# Patient Record
Sex: MALE | Race: OTHER | HISPANIC OR LATINO | URBAN - METROPOLITAN AREA
[De-identification: names, ages, dates, MRNs, and addresses within clinical notes are randomized per-mention and may not be internally consistent; named-entity substitution may affect disease eponyms.]

---

## 2020-09-13 ENCOUNTER — EMERGENCY (EMERGENCY)
Facility: HOSPITAL | Age: 25
LOS: 0 days | Discharge: HOME | End: 2020-09-13
Payer: MEDICAID

## 2020-09-13 DIAGNOSIS — Z20.828 CONTACT WITH AND (SUSPECTED) EXPOSURE TO OTHER VIRAL COMMUNICABLE DISEASES: ICD-10-CM

## 2020-09-13 PROCEDURE — 99283 EMERGENCY DEPT VISIT LOW MDM: CPT

## 2020-09-13 PROCEDURE — U0003: CPT

## 2020-09-13 NOTE — ED STATDOCS - PATIENT PORTAL LINK FT
You can access the FollowMyHealth Patient Portal offered by [facility] by registering at the following website: [email or web address]. By joining [employer]’s FollowMyHealth portal, you will also be able to view your health information using other applications (apps) compatible with our system.

## 2020-09-13 NOTE — ED STATDOCS - OBJECTIVE STATEMENT
26 yo male presents for covid testing s/p exposure to a family member that tested positive during their family gathering last week.

## 2020-09-14 LAB — SARS-COV-2 RNA SPEC QL NAA+PROBE: SIGNIFICANT CHANGE UP

## 2020-10-27 ENCOUNTER — EMERGENCY (EMERGENCY)
Facility: HOSPITAL | Age: 25
LOS: 0 days | Discharge: HOME | End: 2020-10-27
Payer: MEDICAID

## 2020-10-27 VITALS
TEMPERATURE: 99 F | RESPIRATION RATE: 18 BRPM | OXYGEN SATURATION: 99 % | SYSTOLIC BLOOD PRESSURE: 100 MMHG | DIASTOLIC BLOOD PRESSURE: 47 MMHG | HEART RATE: 60 BPM

## 2020-10-27 DIAGNOSIS — R07.0 PAIN IN THROAT: ICD-10-CM

## 2020-10-27 DIAGNOSIS — R52 PAIN, UNSPECIFIED: ICD-10-CM

## 2020-10-27 DIAGNOSIS — J02.9 ACUTE PHARYNGITIS, UNSPECIFIED: ICD-10-CM

## 2020-10-27 PROBLEM — Z78.9 OTHER SPECIFIED HEALTH STATUS: Chronic | Status: ACTIVE | Noted: 2020-09-13

## 2020-10-27 LAB — SARS-COV-2 RNA SPEC QL NAA+PROBE: SIGNIFICANT CHANGE UP

## 2020-10-27 PROCEDURE — 99283 EMERGENCY DEPT VISIT LOW MDM: CPT

## 2020-10-27 PROCEDURE — U0003: CPT

## 2020-10-27 NOTE — ED ADULT NURSE NOTE - OBJECTIVE STATEMENT
Patient comes to ED for COVID swab, denies any exposure. Patient reports "feeling sick"  Patient provides verbal consent to receive results via text or email.  Patient evaluated, treated, and discharged by PA. Refer to PA note for assessment.

## 2020-10-27 NOTE — ED STATDOCS - NS ED ROS FT
ROS: Constitutional- no fever, no chills.  Respiratory- no cough, no SOB  Cardiac- no chest pain, no palpitations, ENT- no rhinorrhea, + sore throat, no congestion.  Abdomen- No nausea, no vomiting, no diarrhea.  Urinary- no dysuria, no urgency, no frequency.  Skin- No rashes

## 2020-10-27 NOTE — ED STATDOCS - PATIENT PORTAL LINK FT
You can access the FollowMyHealth Patient Portal offered by [facility] by registering at the following website: [email or web address]. By joining [employer]’s FollowMyHealth portal, you will also be able to view your health information using other applications (apps) compatible with our system.

## 2020-10-27 NOTE — ED ADULT TRIAGE NOTE - CHIEF COMPLAINT QUOTE
Patient comes to ED for COVID testing. Patient reports "Feeling Sick" Patient denies any COVID exposure.   Patient provides verbal consent to receive results via text or email.

## 2020-10-27 NOTE — ED STATDOCS - NSFOLLOWUPINSTRUCTIONS_ED_ALL_ED_FT
How to get your Coronavirus (COVID-19) Testing Results:   Please be advised that you were tested for the coronavirus (COVID-19) in the Emergency Department at [facility].  You are to maintain self-quarantine procedures for 14 days until instructed otherwise by one of our healthcare agents. Please note that the test may take up to 2-4 days to result.  If you do not hear from us within 72 hours and you'd like to check on your results, you can call on of our coronavirus specialists at [address] [place] [address] (available 24/7).  Please DO NOT call the site where you received the test to obtain your results.

## 2020-10-27 NOTE — ED STATDOCS - OBJECTIVE STATEMENT
He presents reporting 1 day of sore throat and body aches.  Unsure if he was exposed to COVID19 but would like to be tested.

## 2022-01-02 ENCOUNTER — OUTPATIENT (OUTPATIENT)
Dept: OUTPATIENT SERVICES | Facility: HOSPITAL | Age: 27
LOS: 1 days | End: 2022-01-02
Payer: MEDICAID

## 2022-01-02 DIAGNOSIS — Z20.828 CONTACT WITH AND (SUSPECTED) EXPOSURE TO OTHER VIRAL COMMUNICABLE DISEASES: ICD-10-CM

## 2022-01-02 LAB — SARS-COV-2 RNA SPEC QL NAA+PROBE: SIGNIFICANT CHANGE UP

## 2022-01-02 PROCEDURE — C9803: CPT

## 2022-01-02 PROCEDURE — U0005: CPT

## 2022-01-02 PROCEDURE — U0003: CPT

## 2022-01-03 DIAGNOSIS — Z20.828 CONTACT WITH AND (SUSPECTED) EXPOSURE TO OTHER VIRAL COMMUNICABLE DISEASES: ICD-10-CM

## 2023-06-09 ENCOUNTER — EMERGENCY (EMERGENCY)
Facility: HOSPITAL | Age: 28
LOS: 1 days | Discharge: HOME | End: 2023-06-09
Attending: STUDENT IN AN ORGANIZED HEALTH CARE EDUCATION/TRAINING PROGRAM | Admitting: STUDENT IN AN ORGANIZED HEALTH CARE EDUCATION/TRAINING PROGRAM
Payer: MEDICAID

## 2023-06-09 VITALS
DIASTOLIC BLOOD PRESSURE: 61 MMHG | HEART RATE: 75 BPM | TEMPERATURE: 98 F | HEIGHT: 66 IN | WEIGHT: 138.45 LBS | OXYGEN SATURATION: 97 % | SYSTOLIC BLOOD PRESSURE: 107 MMHG | RESPIRATION RATE: 18 BRPM

## 2023-06-09 VITALS
DIASTOLIC BLOOD PRESSURE: 67 MMHG | RESPIRATION RATE: 18 BRPM | HEART RATE: 81 BPM | SYSTOLIC BLOOD PRESSURE: 115 MMHG | TEMPERATURE: 98 F | OXYGEN SATURATION: 96 %

## 2023-06-09 PROCEDURE — 90715 TDAP VACCINE 7 YRS/> IM: CPT

## 2023-06-09 PROCEDURE — 73110 X-RAY EXAM OF WRIST: CPT

## 2023-06-09 PROCEDURE — 73110 X-RAY EXAM OF WRIST: CPT | Mod: 26,RT

## 2023-06-09 PROCEDURE — 29125 APPL SHORT ARM SPLINT STATIC: CPT | Mod: RT

## 2023-06-09 PROCEDURE — 90471 IMMUNIZATION ADMIN: CPT

## 2023-06-09 PROCEDURE — 99283 EMERGENCY DEPT VISIT LOW MDM: CPT | Mod: 25

## 2023-06-09 PROCEDURE — 99284 EMERGENCY DEPT VISIT MOD MDM: CPT | Mod: 25

## 2023-06-09 RX ORDER — TETANUS TOXOID, REDUCED DIPHTHERIA TOXOID AND ACELLULAR PERTUSSIS VACCINE, ADSORBED 5; 2.5; 8; 8; 2.5 [IU]/.5ML; [IU]/.5ML; UG/.5ML; UG/.5ML; UG/.5ML
0.5 SUSPENSION INTRAMUSCULAR ONCE
Refills: 0 | Status: COMPLETED | OUTPATIENT
Start: 2023-06-09 | End: 2023-06-09

## 2023-06-09 RX ORDER — IBUPROFEN 200 MG
600 TABLET ORAL ONCE
Refills: 0 | Status: COMPLETED | OUTPATIENT
Start: 2023-06-09 | End: 2023-06-09

## 2023-06-09 RX ADMIN — Medication 600 MILLIGRAM(S): at 21:57

## 2023-06-09 RX ADMIN — TETANUS TOXOID, REDUCED DIPHTHERIA TOXOID AND ACELLULAR PERTUSSIS VACCINE, ADSORBED 0.5 MILLILITER(S): 5; 2.5; 8; 8; 2.5 SUSPENSION INTRAMUSCULAR at 21:38

## 2023-06-09 RX ADMIN — Medication 600 MILLIGRAM(S): at 21:37

## 2023-06-09 NOTE — ED PROVIDER NOTE - NS ED ATTENDING STATEMENT MOD
This was a shared visit with the [facility]. I reviewed and verified the documentation and independently performed the documented:

## 2023-06-09 NOTE — ED ADULT NURSE NOTE - OBJECTIVE STATEMENT
pt presented with c/o right wrist pain/injury. pt reports he was skateboarding and fell an hour ago.

## 2023-06-09 NOTE — ED PROVIDER NOTE - ADDITIONAL NOTES AND INSTRUCTIONS:
Patient cannot use his right arm/wrist until cleared by Orthopedist. Has to keep splint clean/dry/intact.

## 2023-06-09 NOTE — ED ADULT NURSE REASSESSMENT NOTE - NS ED NURSE REASSESS COMMENT FT1
Tests resulted, right arm splinted by PA and placed on sling, VSS, pt reevaluated by MD, D/C home with f/u instructions.

## 2023-06-09 NOTE — ED ADULT NURSE REASSESSMENT NOTE - NSFALLRISKFACTORS_ED_ALL_ED
wrist fracture/Bone Condition: Including osteoporosis, prolonged steroid use or metastatic bone disease/cancer

## 2023-06-09 NOTE — ED PROVIDER NOTE - MUSCULOSKELETAL, MLM
+ttp with mild swelling noted to distal ulna, ROM intact right wrist, abrasion noted to palm, R shoulder/elbow/hand NT with FROM, no snuffbox tenderness, fingers warm & mobile, able to make a fist, cap refill<2sec, distal pulses and sensation intact, NVI

## 2023-06-09 NOTE — ED PROVIDER NOTE - PATIENT PORTAL LINK FT
You can access the FollowMyHealth Patient Portal offered by [facility] by registering at the following website: [email or web address]. By joining [facility] [employer]’s FollowMyHealth portal, you will also be able to view your health information using other applications (apps) compatible with our system.

## 2023-06-09 NOTE — ED PROVIDER NOTE - CARE PROVIDER_API CALL
Jose Rafael Cedeno.  Orthopaedic Surgery  [address], [address]  [place] [address]  Phone: [phone]  Fax: [phone]  Follow Up Time: 1-3 Days

## 2023-06-09 NOTE — ED PROVIDER NOTE - ATTENDING APP SHARED VISIT CONTRIBUTION OF CARE
28-year-old male with no significant PMH presents with right wrist pain status post fall off a skateboard.  Patient states he lost his balance and landed on his right wrist.  Currently complains of pain to the lateral aspect of his wrist.  Patient also sustained superficial abrasions to the area, unknown last tetanus.  Patient denies head trauma or LOC.  Denies any other injuries.    AO x 3 in NAD  Head NC/AT, full ROM of c-spine  RRR, S1S2  Lungs CTA b/l  Right wrist with swelling along ulnar aspect, +tenderness at ulnar styloid with +TTP and ecchymosis.  Full ROM of right wrist and finger.  Abrasions to right palm.  No snuffbox tenderness.  Normal sensation, +radial pulse, cap refill < 2 seconds

## 2023-06-09 NOTE — ED PROVIDER NOTE - OBJECTIVE STATEMENT
28-year-old male with no past medical history presents with complaint of right wrist pain status post fall today.  Patient states that he was skateboarding and fell on his right wrist.  Patient is right-hand dominant.  Patient sustained abrasion to his hand and unsure of last tetanus.  Denies numbness, tingling, head trauma, LOC, other injuries or symptoms.

## 2023-06-09 NOTE — ED PROVIDER NOTE - NSFOLLOWUPINSTRUCTIONS_ED_ALL_ED_FT
Follow-up with orthopedist for reevaluation, ongoing care and treatment.  Rest, nonweightbearing right arm, elevate wrist, ice compresses, take Motrin 600 mg every 6 hours with food as needed for pain.  Keep splint clean/dry/intact.  If having worsening of symptoms or other related symptoms, return to the ER immediately.    Wrist Fracture Treated With Immobilization    A wrist fracture is a break or crack in one of the bones of your wrist. Your wrist is made of eight small bones at the palm of your hand (carpal bones) and two long bones of the forearm (radius and ulna).    A broken wrist is often treated by wearing a cast, splint, or sling (immobilization). This holds the broken pieces in place so they can heal.    What are the causes?  A direct force to the wrist.  Trauma, such as a car crash or falling on an outstretched hand.  What increases the risk?  Doing contact sports or high-risk sports such as skiing, biking, and ice skating.  Taking steroid medicines.  Smoking.  Being female.  Being .  Drinking more than three drinks that contain alcohol a day.  Having a condition that weakens the bones (osteoporosis).  Being older.  Having had other broken bones in the past.  What are the signs or symptoms?  Pain.  Swelling.  Bruising.  Not being able to move the wrist normally.  The wrist hanging in an odd position or looking oddly shaped.  How is this treated?  Treatment involves wearing a cast, splint, or sling. You may also need to take pain medicine. Once the injury has healed enough, you may begin doing range-of-motion exercises.    Follow these instructions at home:  If you have a cast:    Do not stick anything inside the cast to scratch your skin.  Check the skin around the cast every day. Tell your doctor if you see problems.  You may put lotion on dry skin around the cast. Do not put lotion on the skin under the cast.  Keep the cast clean and dry.  If you have a splint or sling:    Wear the splint or sling as told by your doctor. Take it off only as told by your doctor.  Loosen the splint or sling if your fingers:  Tingle.  Become numb.  Turn cold and blue.  Keep the splint or sling clean and dry.  Bathing    Do not take baths, swim, or use a hot tub. Ask your doctor about taking showers or sponge baths.  If your cast, splint, or sling is not waterproof:  Do not let it get wet.  Cover it with a watertight covering when you take a bath or shower.  If you have a sling, take it off for bathing only if your doctor says this is okay.  Managing pain, stiffness, and swelling      If told, put ice on the injured area. To do this:  If you have a removable splint or sling, take it off as told by your doctor.  Put ice in a plastic bag.  Place a towel between your skin and the bag or between your cast and the bag.  Leave the ice on for 20 minutes, 2–3 times a day.  Take off the ice if your skin turns bright red. This is very important. If you cannot feel pain, heat, or cold, you have a greater risk of damage to the area.  Move your fingers often.  Raise the injured area above the level of your heart while you are sitting or lying down.  Activity    Return to your normal activities when your doctor says that it is safe.  Do not lift with or put weight on your injured wrist until your doctor says this is okay.  Ask your doctor when it is safe to drive if you have a cast, splint, or sling on your wrist.  Do range-of-motion exercises only as told by your doctor.  Medicines    Take over-the-counter and prescription medicines only as told by your doctor.  Ask your doctor if you should avoid driving or using machines while you are taking your medicine.  General instructions    Do not put pressure on any part of the cast or splint until it is fully hardened.  Do not smoke or use any products that contain nicotine or tobacco. If you need help quitting, ask your doctor.  If told, take steps to prevent problems with pooping (constipation). You may need to:  Drink enough fluid to keep your pee (urine) pale yellow.  Take medicines. You will be told what medicines to take.  Eat foods that are high in fiber. These include beans, whole grains, and fresh fruits and vegetables.  Limit foods that are high in fat and sugar. These include fried or sweet foods.  Keep all follow-up visits.  Contact a doctor if:  Your cast, splint, or sling is damaged or loose.  You have any new pain, swelling, or bruising.  Your pain, swelling, and bruising do not get better.  You have a fever.  You have chills.  Get help right away if:  Your skin or fingers on your injured arm turn blue or gray.  Your arm feels cold or gets numb.  You have very bad pain in your injured wrist.  Summary  A wrist fracture is a break or crack in one of the bones of your wrist.  A broken wrist is often treated by wearing a cast, splint, or sling.  You should check the skin around a cast every day.  Take off a splint or sling only as told by your doctor.  This information is not intended to replace advice given to you by your health care provider. Make sure you discuss any questions you have with your health care provider.

## 2023-06-09 NOTE — ED ADULT NURSE REASSESSMENT NOTE - NSFALLHARMRISKINTERV_ED_ALL_ED

## 2023-06-09 NOTE — ED PROVIDER NOTE - DIFFERENTIAL DIAGNOSIS
Ddx includes but not limited to fracture, avulsion, dislocation, sprain, hematoma Differential Diagnosis

## 2023-06-09 NOTE — ED PROVIDER NOTE - CLINICAL SUMMARY MEDICAL DECISION MAKING FREE TEXT BOX
28 year old male p/w right wrist pain s/p fall off skateboard.  +swelling and TTP to ulnar styloid.  X-ray, analgesia, update tetanus, splint as needed, ortho follow up

## 2023-06-09 NOTE — ED ADULT NURSE NOTE - NSFALLRISKINTERV_ED_ALL_ED

## 2023-06-14 ENCOUNTER — TRANSCRIPTION ENCOUNTER (OUTPATIENT)
Age: 28
End: 2023-06-14

## 2023-06-14 ENCOUNTER — APPOINTMENT (OUTPATIENT)
Dept: ORTHOPEDIC SURGERY | Facility: CLINIC | Age: 28
End: 2023-06-14
Payer: MEDICAID

## 2023-06-14 VITALS
DIASTOLIC BLOOD PRESSURE: 64 MMHG | WEIGHT: 136 LBS | HEART RATE: 61 BPM | HEIGHT: 68 IN | BODY MASS INDEX: 20.61 KG/M2 | SYSTOLIC BLOOD PRESSURE: 107 MMHG

## 2023-06-14 PROBLEM — Z00.00 ENCOUNTER FOR PREVENTIVE HEALTH EXAMINATION: Status: ACTIVE | Noted: 2023-06-14

## 2023-06-14 PROCEDURE — 99204 OFFICE O/P NEW MOD 45 MIN: CPT | Mod: 57

## 2023-06-14 PROCEDURE — 25650 CLTX ULNAR STYLOID FRACTURE: CPT | Mod: RT

## 2023-06-14 RX ORDER — MELOXICAM 15 MG/1
15 TABLET ORAL DAILY
Qty: 30 | Refills: 2 | Status: ACTIVE | COMMUNITY
Start: 2023-06-14 | End: 1900-01-01

## 2023-06-14 NOTE — PHYSICAL EXAM
[de-identified] : Gen NAD\par Nonlabored respirations\par \par r hand\par Skin intact\par splint cdi\par Moving fingers\par SILT amur\par wwp bcr\par  [de-identified] : I independently reviewed and interpreted the xrays of the R wrist - ulnar styloid frx.  No acute osseous abnormalities.

## 2023-06-14 NOTE — HISTORY OF PRESENT ILLNESS
[de-identified] : 28yM pw R wrist pain.  Pt was skateboarding and landed on his R wrist, no other trauma.  No n/p.  7/10 pain without radiation,  Placed in splint in ER and referred here.  No hx of frx.

## 2023-06-14 NOTE — DISCUSSION/SUMMARY
[de-identified] : 28y RHD M pw R ulnar styloid frx.  The patient was extensively counseled on treatment options including but not limited to observation, rest/activity modification, bracing, anti-inflammatory medications, physical therapy, injections, and surgery.  The natural history of the disease was thoroughly explained.  We discussed that the majority of the time, this condition can be initially treated conservatively.\par The patient will proceed with:\par -nwb in splint x10d - then transition to removable wrist splint\par -meloxicam rx provided - pt instructed to take with meals and not with other nsaid's including advil, aleve, and toradol.  The pt understands to stop taking the medication if any stomach sx develop or they develop any type of bleeding or bruising, at which point they will present to their PMD\par -pt was instructed on the importance of resting, icing and elevating to minimize swelling\par -RTC 3-4w, no wrist xr, possible OT\par \par I have personally obtained the history, reviewed the ROS as noted, and performed the physical examination today.  The patient and I discussed the assessment and options and developed the plan.  All questions were answered and the patient stated their understanding of the treatment plan and appreciation of the visit.\par \par My cumulative time spent on this patient's visit included: Preparation for the visit, review of the medical records, review of pertinent diagnostic studies, examination and counseling of the patient on the above diagnosis, treatment plan and prognosis, orders of diagnostic tests, medications and/or appropriate procedures and documentation in the medical records of today's visit. \par \par Jose Rafael Cedeno MD

## 2023-06-28 ENCOUNTER — APPOINTMENT (OUTPATIENT)
Dept: ORTHOPEDIC SURGERY | Facility: CLINIC | Age: 28
End: 2023-06-28
Payer: MEDICAID

## 2023-06-28 DIAGNOSIS — S62.109A FRACTURE OF UNSPECIFIED CARPAL BONE, UNSPECIFIED WRIST, INITIAL ENCOUNTER FOR CLOSED FRACTURE: ICD-10-CM

## 2023-06-28 PROCEDURE — 99024 POSTOP FOLLOW-UP VISIT: CPT

## 2023-06-28 NOTE — DISCUSSION/SUMMARY
[de-identified] : 28y RHD M pw R ulnar styloid frx.  The patient was extensively counseled on treatment options including but not limited to observation, rest/activity modification, bracing, anti-inflammatory medications, physical therapy, injections, and surgery.  The natural history of the disease was thoroughly explained.  We discussed that the majority of the time, this condition can be initially treated conservatively.\par The patient will proceed with:\par -adv to wbat\par -meloxicam rx provided - pt instructed to take with meals and not with other nsaid's including advil, aleve, and toradol.  The pt understands to stop taking the medication if any stomach sx develop or they develop any type of bleeding or bruising, at which point they will present to their PMD\par -pt was instructed on the importance of resting, icing and elevating to minimize swelling\par -no work for 2 more weeks as involved heavy lifting\par \par \par I have personally obtained the history, reviewed the ROS as noted, and performed the physical examination today.  The patient and I discussed the assessment and options and developed the plan.  All questions were answered and the patient stated their understanding of the treatment plan and appreciation of the visit.\par \par My cumulative time spent on this patient's visit included: Preparation for the visit, review of the medical records, review of pertinent diagnostic studies, examination and counseling of the patient on the above diagnosis, treatment plan and prognosis, orders of diagnostic tests, medications and/or appropriate procedures and documentation in the medical records of today's visit. \par \par Jose Rafael Cedeno MD

## 2023-06-28 NOTE — PHYSICAL EXAM
[de-identified] : Gen NAD\par Nonlabored respirations\par \par r hand\par Skin intact\par splint cdi\par Moving fingers\par SILT amur\par wwp bcr\par  [de-identified] : I independently reviewed and interpreted the xrays of the R wrist - ulnar styloid frx.  No acute osseous abnormalities.

## 2023-06-28 NOTE — HISTORY OF PRESENT ILLNESS
[de-identified] : 28yM pw R wrist pain.  Pt was skateboarding and landed on his R wrist, no other trauma.  No n/p.  7/10 pain without radiation,  Placed in splint in ER and referred here.  No hx of frx.\par \par 6/28 interval - pain is better today with rest and ice, still has pain w certain wrist turning motions but down to 2-3/10

## 2023-10-30 NOTE — ED STATDOCS - CONDITION AT DISCHARGE:
Satisfactory Griseofulvin Counseling:  I discussed with the patient the risks of griseofulvin including but not limited to photosensitivity, cytopenia, liver damage, nausea/vomiting and severe allergy.  The patient understands that this medication is best absorbed when taken with a fatty meal (e.g., ice cream or french fries).

## 2024-12-11 NOTE — ED ADULT NURSE NOTE - NURSING MUSC LEG STRENGTH LEFT
Eeg  Labs as ordered  No new medications recommended at this time pending above workup  Keep follow-up with primary care   extension strong/flexion strong